# Patient Record
Sex: MALE | Race: WHITE | NOT HISPANIC OR LATINO
[De-identification: names, ages, dates, MRNs, and addresses within clinical notes are randomized per-mention and may not be internally consistent; named-entity substitution may affect disease eponyms.]

---

## 2024-06-02 ENCOUNTER — NON-APPOINTMENT (OUTPATIENT)
Age: 64
End: 2024-06-02

## 2024-06-07 ENCOUNTER — APPOINTMENT (OUTPATIENT)
Dept: PULMONOLOGY | Facility: CLINIC | Age: 64
End: 2024-06-07
Payer: COMMERCIAL

## 2024-06-07 DIAGNOSIS — K21.9 GASTRO-ESOPHAGEAL REFLUX DISEASE W/OUT ESOPHAGITIS: ICD-10-CM

## 2024-06-07 DIAGNOSIS — Z86.39 PERSONAL HISTORY OF OTHER ENDOCRINE, NUTRITIONAL AND METABOLIC DISEASE: ICD-10-CM

## 2024-06-07 DIAGNOSIS — J00 ACUTE NASOPHARYNGITIS [COMMON COLD]: ICD-10-CM

## 2024-06-07 PROBLEM — Z00.00 ENCOUNTER FOR PREVENTIVE HEALTH EXAMINATION: Status: ACTIVE | Noted: 2024-06-07

## 2024-06-07 PROCEDURE — 99204 OFFICE O/P NEW MOD 45 MIN: CPT

## 2024-06-07 RX ORDER — ROSUVASTATIN CALCIUM 40 MG/1
40 TABLET, FILM COATED ORAL DAILY
Refills: 0 | Status: ACTIVE | COMMUNITY
Start: 2024-06-07

## 2024-06-07 RX ORDER — ALBUTEROL SULFATE 2.5 MG/3ML
(2.5 MG/3ML) SOLUTION RESPIRATORY (INHALATION)
Refills: 0 | Status: ACTIVE | COMMUNITY
Start: 2024-06-07

## 2024-06-07 RX ORDER — FLUTICASONE PROPIONATE 50 UG/1
50 SPRAY, METERED NASAL
Qty: 1 | Refills: 11 | Status: ACTIVE | COMMUNITY
Start: 2024-06-07

## 2024-06-07 RX ORDER — BUDESONIDE AND FORMOTEROL FUMARATE DIHYDRATE 160; 4.5 UG/1; UG/1
160-4.5 AEROSOL RESPIRATORY (INHALATION) TWICE DAILY
Qty: 1 | Refills: 11 | Status: ACTIVE | COMMUNITY
Start: 2024-06-07 | End: 1900-01-01

## 2024-06-07 RX ORDER — ALBUTEROL SULFATE AND BUDESONIDE 90; 80 UG/1; UG/1
90-80 AEROSOL, METERED RESPIRATORY (INHALATION)
Refills: 0 | Status: ACTIVE | COMMUNITY
Start: 2024-06-07

## 2024-06-07 RX ORDER — PANTOPRAZOLE SODIUM 20 MG/1
20 TABLET, DELAYED RELEASE ORAL DAILY
Refills: 0 | Status: ACTIVE | COMMUNITY
Start: 2024-06-07

## 2024-06-10 VITALS
DIASTOLIC BLOOD PRESSURE: 74 MMHG | RESPIRATION RATE: 12 BRPM | OXYGEN SATURATION: 97 % | SYSTOLIC BLOOD PRESSURE: 124 MMHG | TEMPERATURE: 97 F | HEART RATE: 72 BPM

## 2024-06-10 PROBLEM — K21.9 GASTROESOPHAGEAL REFLUX DISEASE WITHOUT ESOPHAGITIS: Status: ACTIVE | Noted: 2024-06-07

## 2024-06-10 PROBLEM — Z86.39 HISTORY OF HYPERLIPIDEMIA: Status: ACTIVE | Noted: 2024-06-07

## 2024-06-10 NOTE — ASSESSMENT
[FreeTextEntry1] : I discussed the condition in detail with the patient.  The patient had exposure 911 and was diagnosed with acute rhinitis to his occupational exposure.  In my opinion the condition is most likely related to underlying bronchial asthma.  The patient has bronchospastic cough, adequate response to the bronchodilator, and nocturnal symptoms.  The condition is also interfering with quality of life that he has to cancel and function.  There is also limiting his exercise capacity.  I also explained to the patient that there is increased incidence of sarcoidosis with 9/11 exposure and hyperreactive airway could be a presentation of underlying sarcoidosis.  My recommendation is to continue on the 5-day course of prednisone, continue combination of short acting beta agonist inhaled steroids as as needed, and started the patient on maintenance therapy 2 puff of Symbicort twice a day.  PFT and CT scan of the chest to rule out underlying sarcoidosis.

## 2024-06-10 NOTE — REVIEW OF SYSTEMS
[Negative] : Endocrine [Cough] : cough [Wheezing] : wheezing [Hemoptysis] : no hemoptysis [Chest Tightness] : no chest tightness [Frequent URIs] : no frequent URIs [Sputum] : no sputum [Dyspnea] : no dyspnea [A.M. Dry Mouth] : no a.m. dry mouth [SOB on Exertion] : no sob on exertion

## 2024-06-10 NOTE — HISTORY OF PRESENT ILLNESS
[Never] : never [TextBox_4] : He is exposed to bed 911.  He is followed with an 911 and was diagnosed with rhinitis and acid reflux related to his exposure.  About a month ago he started having acute viral illness but the cough lasted since then.  He was cough suppressant.  He was seen in urgent care and his primary care.  He was started on the inhaler which seems to help but also was started on prednisone and this is the second course.  The cough wakes him up at night for about 2-3 nights in a week.  This week he had the to cancel a function as he stayed home because of the cough.  He sometimes wheeze.  No fever [ESS] : 0

## 2024-06-16 ENCOUNTER — RESULT REVIEW (OUTPATIENT)
Age: 64
End: 2024-06-16

## 2024-06-18 ENCOUNTER — APPOINTMENT (OUTPATIENT)
Dept: PULMONOLOGY | Facility: CLINIC | Age: 64
End: 2024-06-18
Payer: COMMERCIAL

## 2024-06-18 VITALS
HEIGHT: 74 IN | TEMPERATURE: 98 F | SYSTOLIC BLOOD PRESSURE: 126 MMHG | WEIGHT: 230 LBS | DIASTOLIC BLOOD PRESSURE: 79 MMHG | HEART RATE: 71 BPM | BODY MASS INDEX: 29.52 KG/M2 | OXYGEN SATURATION: 98 % | RESPIRATION RATE: 20 BRPM

## 2024-06-18 DIAGNOSIS — J45.40 MODERATE PERSISTENT ASTHMA, UNCOMPLICATED: ICD-10-CM

## 2024-06-18 PROCEDURE — 99214 OFFICE O/P EST MOD 30 MIN: CPT

## 2024-06-18 NOTE — HISTORY OF PRESENT ILLNESS
[Never] : never [TextBox_4] : Is likely doing better but he is using the Symbicort 1 puff a day.  He did not require the albuterol since the last time he saw me.  His cough and wheezing improved but is not completely.  Still having a problem with shortness of breath.  She still have postnasal drip associated with hoarseness of voice [ESS] : 0

## 2024-06-18 NOTE — ASSESSMENT
[FreeTextEntry1] : Bronchial asthma  The clinical scenario is very consistent with bronchial asthma.  Bronchospasm is relieved with the short acting beta agonist.  Patient slightly improved with the Symbicort but is only using 1 puff once a day which is about 25% of the recommended dose.  The PFT was consistent with decrease in the FEV1 and FVC with normal ratio and normal diffusion capacity consistent with THOMAS.  The CT scan of the chest was unremarkable with no evidence of thickness of the bronchial wall no pulmonary nodules/mediastinal adenopathy.  No evidence of sarcoidosis.  At this point I explained the condition with the patient and the picture is consistent with bronchial asthma due to the occupational exposure 911 which is resulted in acute rhinitis and bronchial asthma.  Patient is to continue on Symbicort 2 puffs twice a day with as needed short acting beta agonist.  He is off the systemic steroids.  Might need to do a methacholine test.  I will proceed with metabolic exercise test if the dyspnea on exertion does not improve  Allergic rhinitis   Reviewed the CT scan of the chest with the patient and wife.  The CT scan from pulmonary point of view is no normal and he is referred to cardiology for the cardiac calcification evident on the CT scan Continue current treatment.
no

## 2025-05-20 ENCOUNTER — RX RENEWAL (OUTPATIENT)
Age: 65
End: 2025-05-20

## 2025-05-20 RX ORDER — BUDESONIDE AND FORMOTEROL FUMARATE DIHYDRATE 160; 4.5 UG/1; UG/1
160-4.5 AEROSOL RESPIRATORY (INHALATION) TWICE DAILY
Qty: 1 | Refills: 11 | Status: ACTIVE | COMMUNITY
Start: 2025-05-20 | End: 1900-01-01